# Patient Record
Sex: MALE | Race: WHITE
[De-identification: names, ages, dates, MRNs, and addresses within clinical notes are randomized per-mention and may not be internally consistent; named-entity substitution may affect disease eponyms.]

---

## 2019-07-26 ENCOUNTER — HOSPITAL ENCOUNTER (OUTPATIENT)
Dept: HOSPITAL 11 - JP.SDS | Age: 69
Discharge: HOME | End: 2019-07-26
Attending: SURGERY
Payer: MEDICARE

## 2019-07-26 VITALS — HEART RATE: 63 BPM | SYSTOLIC BLOOD PRESSURE: 108 MMHG | DIASTOLIC BLOOD PRESSURE: 73 MMHG

## 2019-07-26 DIAGNOSIS — F17.200: ICD-10-CM

## 2019-07-26 DIAGNOSIS — Z88.0: ICD-10-CM

## 2019-07-26 DIAGNOSIS — K92.1: Primary | ICD-10-CM

## 2019-07-26 DIAGNOSIS — Z88.8: ICD-10-CM

## 2019-07-26 DIAGNOSIS — E78.00: ICD-10-CM

## 2019-07-26 PROCEDURE — 45378 DIAGNOSTIC COLONOSCOPY: CPT

## 2019-07-29 NOTE — OR
DATE OF PROCEDURE:  07/26/2019

 

PREOPERATIVE DIAGNOSIS:  Blood in stool.

 

POSTOPERATIVE DIAGNOSES:

1. Blood in stool, etiology unknown.

2. Unremarkable colonoscopy.

 

PROCEDURE:  Colonoscopy to the cecum.

 

ANESTHESIA:  IV anesthesia with monitored anesthesia care.

 

SURGEON: Romario Banuelos MD

 

INDICATION:  This 68-year-old white male is referred for a colonoscopy because of blood in

his stool.  He says his last colonoscopic exam was done about 6 years ago.  I counseled him

for the procedure including risks and alternatives, and he gave his informed consent to

proceed.

 

DESCRIPTION OF PROCEDURE:  The patient was placed in the left lateral decubitus position.

IV anesthesia was administered by the Anesthesia Service.  Time-out was held.  A rectal exam

was performed, which was unremarkable.  The flexible video Olympus colonoscope was

introduced through his anus, up his rectum, and out his colon all the way to the cecum.

Once the cecum was reached, the scope was slowly withdrawn examining the mucosa throughout.

No mucosal abnormalities were noted.  The scope was retroflexed in the rectum with the

distal rectum appearing unremarkable.  The scope was straightened and removed.  He tolerated

the procedure well.

 

 

 

 

Romario Banuelos MD

DD:  07/26/2019 09:59:48

DT:  07/26/2019 16:57:34

Job #:  [id number]